# Patient Record
Sex: MALE | Employment: OTHER | URBAN - METROPOLITAN AREA
[De-identification: names, ages, dates, MRNs, and addresses within clinical notes are randomized per-mention and may not be internally consistent; named-entity substitution may affect disease eponyms.]

---

## 2024-03-19 ENCOUNTER — HOSPITAL ENCOUNTER (OUTPATIENT)
Dept: RADIOLOGY | Facility: HOSPITAL | Age: 79
Discharge: HOME | End: 2024-03-19
Payer: COMMERCIAL

## 2024-03-19 DIAGNOSIS — R10.9 ABDOMINAL PAIN: ICD-10-CM

## 2024-03-19 DIAGNOSIS — R11.10 INTERMITTENT VOMITING: ICD-10-CM

## 2024-03-19 PROCEDURE — 74160 CT ABDOMEN W/CONTRAST: CPT

## 2024-03-19 PROCEDURE — 74160 CT ABDOMEN W/CONTRAST: CPT | Performed by: RADIOLOGY

## 2024-03-19 PROCEDURE — 2550000001 HC RX 255 CONTRASTS

## 2024-03-19 RX ADMIN — IOHEXOL 75 ML: 350 INJECTION, SOLUTION INTRAVENOUS at 10:20

## 2025-06-17 ENCOUNTER — TELEPHONE (OUTPATIENT)
Dept: SURGERY | Facility: HOSPITAL | Age: 80
End: 2025-06-17

## 2025-06-17 ENCOUNTER — APPOINTMENT (OUTPATIENT)
Dept: SURGERY | Facility: CLINIC | Age: 80
End: 2025-06-17
Payer: COMMERCIAL

## 2025-06-17 NOTE — TELEPHONE ENCOUNTER
Patient showed up to office today without any medical records asking for stoma reversal.  Informed him and his caregiver to return to nursing home until I obtained records.  I did obtain them later.  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 He ultimately did recover.  I called the home to inform him that given his age, comorbidities, and the fact his bowel became ischemic from his prior surgery that I would not be able to reverse his stoma.  He is at high risk for periopertaive complications and I am very concerned that his anastomosis will not heal due to poor blood supply.  I told his nurse he is happy to come to the office or have me explain to him in person, but I am concerned about high risk of failure and other perioperative complications to undergo a stoma reversal.  He is welcome to find another surgeon as well, but I will not be able to perform his surgery as a result of this.    Ghanshyam Lopez MD  06/17/25  1:36 PM

## 2025-07-01 ENCOUNTER — APPOINTMENT (OUTPATIENT)
Dept: SURGERY | Facility: CLINIC | Age: 80
End: 2025-07-01
Payer: COMMERCIAL